# Patient Record
Sex: MALE | Race: OTHER | Employment: STUDENT | URBAN - METROPOLITAN AREA
[De-identification: names, ages, dates, MRNs, and addresses within clinical notes are randomized per-mention and may not be internally consistent; named-entity substitution may affect disease eponyms.]

---
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
## 2023-11-26 ENCOUNTER — HOSPITAL ENCOUNTER (EMERGENCY)
Facility: HOSPITAL | Age: 18
Discharge: HOME | End: 2023-11-26
Attending: EMERGENCY MEDICINE
Payer: COMMERCIAL

## 2023-11-26 VITALS
RESPIRATION RATE: 18 BRPM | OXYGEN SATURATION: 100 % | HEIGHT: 69 IN | SYSTOLIC BLOOD PRESSURE: 151 MMHG | HEART RATE: 97 BPM | DIASTOLIC BLOOD PRESSURE: 60 MMHG | BODY MASS INDEX: 25.18 KG/M2 | TEMPERATURE: 98 F | WEIGHT: 170 LBS

## 2023-11-26 DIAGNOSIS — B00.9 HSV (HERPES SIMPLEX VIRUS) INFECTION: Primary | ICD-10-CM

## 2023-11-26 PROCEDURE — 99283 EMERGENCY DEPT VISIT LOW MDM: CPT

## 2023-11-26 PROCEDURE — 99284 EMERGENCY DEPT VISIT MOD MDM: CPT

## 2023-11-26 RX ORDER — VALACYCLOVIR HYDROCHLORIDE 1 G/1
1000 TABLET, FILM COATED ORAL 3 TIMES DAILY
Qty: 21 TABLET | Refills: 0 | Status: SHIPPED | OUTPATIENT
Start: 2023-11-26 | End: 2023-12-03

## 2023-11-26 NOTE — DISCHARGE INSTRUCTIONS
Return to emergency room for any fevers of 100.4, worsening redness, fluid wave shift, any worsening symptoms. Please follow with dermatology as an outpatient. Please follow with primary care provider as an outpatient. Wound recheck in 2 days with primary care provider/urgent care. The Emergency Department is not intended to be a substitute for an effort to provide complete medical care. The imaging, if any, have often been interpreted on a preliminary basis pending final reading by the radiologist. If your blood pressure was greater than 140/90, please have this blood pressure rechecked by your primary care provider in the next several days.

## 2023-11-26 NOTE — ED INITIAL ASSESSMENT (HPI)
C/o of blister on his nose, and says he usually comes here and they prescribe him with antivirals and they improve.

## 2024-07-20 ENCOUNTER — HOSPITAL ENCOUNTER (EMERGENCY)
Facility: HOSPITAL | Age: 19
Discharge: HOME | End: 2024-07-20
Attending: EMERGENCY MEDICINE
Payer: COMMERCIAL

## 2024-07-20 ENCOUNTER — APPOINTMENT (OUTPATIENT)
Dept: GENERAL RADIOLOGY | Facility: HOSPITAL | Age: 19
End: 2024-07-20
Attending: EMERGENCY MEDICINE
Payer: COMMERCIAL

## 2024-07-20 ENCOUNTER — APPOINTMENT (OUTPATIENT)
Dept: ULTRASOUND IMAGING | Facility: HOSPITAL | Age: 19
End: 2024-07-20
Attending: EMERGENCY MEDICINE
Payer: COMMERCIAL

## 2024-07-20 ENCOUNTER — APPOINTMENT (OUTPATIENT)
Dept: CT IMAGING | Facility: HOSPITAL | Age: 19
End: 2024-07-20
Attending: EMERGENCY MEDICINE
Payer: COMMERCIAL

## 2024-07-20 ENCOUNTER — HOSPITAL ENCOUNTER (OUTPATIENT)
Age: 19
Discharge: ANOTHER HOSPITAL | End: 2024-07-20
Payer: COMMERCIAL

## 2024-07-20 VITALS
HEART RATE: 112 BPM | WEIGHT: 180 LBS | OXYGEN SATURATION: 99 % | SYSTOLIC BLOOD PRESSURE: 124 MMHG | TEMPERATURE: 99 F | BODY MASS INDEX: 27.28 KG/M2 | RESPIRATION RATE: 22 BRPM | HEIGHT: 68 IN | DIASTOLIC BLOOD PRESSURE: 74 MMHG

## 2024-07-20 VITALS
TEMPERATURE: 100 F | DIASTOLIC BLOOD PRESSURE: 71 MMHG | RESPIRATION RATE: 20 BRPM | OXYGEN SATURATION: 100 % | HEART RATE: 122 BPM | SYSTOLIC BLOOD PRESSURE: 129 MMHG

## 2024-07-20 DIAGNOSIS — R50.9 FEVER, UNSPECIFIED FEVER CAUSE: ICD-10-CM

## 2024-07-20 DIAGNOSIS — N45.1 LEFT EPIDIDYMITIS: Primary | ICD-10-CM

## 2024-07-20 DIAGNOSIS — N50.811 PAIN IN BOTH TESTICLES: Primary | ICD-10-CM

## 2024-07-20 DIAGNOSIS — N50.812 PAIN IN BOTH TESTICLES: Primary | ICD-10-CM

## 2024-07-20 DIAGNOSIS — R00.0 TACHYCARDIA: ICD-10-CM

## 2024-07-20 LAB
ANION GAP SERPL CALC-SCNC: 8 MMOL/L (ref 0–18)
BASOPHILS # BLD AUTO: 0.03 X10(3) UL (ref 0–0.2)
BASOPHILS NFR BLD AUTO: 0.2 %
BILIRUB UR QL: NEGATIVE
BUN BLD-MCNC: 14 MG/DL (ref 9–23)
BUN/CREAT SERPL: 13.3 (ref 10–20)
CALCIUM BLD-MCNC: 9.1 MG/DL (ref 8.7–10.4)
CHLORIDE SERPL-SCNC: 107 MMOL/L (ref 98–112)
CLARITY UR: CLEAR
CO2 SERPL-SCNC: 24 MMOL/L (ref 21–32)
COLOR UR: YELLOW
CREAT BLD-MCNC: 1.05 MG/DL
DEPRECATED RDW RBC AUTO: 41.7 FL (ref 35.1–46.3)
EGFRCR SERPLBLD CKD-EPI 2021: 104 ML/MIN/1.73M2 (ref 60–?)
EOSINOPHIL # BLD AUTO: 0 X10(3) UL (ref 0–0.7)
EOSINOPHIL NFR BLD AUTO: 0 %
ERYTHROCYTE [DISTWIDTH] IN BLOOD BY AUTOMATED COUNT: 12.9 % (ref 11–15)
FLUAV + FLUBV RNA SPEC NAA+PROBE: NEGATIVE
FLUAV + FLUBV RNA SPEC NAA+PROBE: NEGATIVE
GLUCOSE BLD-MCNC: 100 MG/DL (ref 70–99)
GLUCOSE UR-MCNC: NORMAL MG/DL
HCT VFR BLD AUTO: 44.4 %
HGB BLD-MCNC: 15.6 G/DL
HGB UR QL STRIP.AUTO: NEGATIVE
IMM GRANULOCYTES # BLD AUTO: 0.04 X10(3) UL (ref 0–1)
IMM GRANULOCYTES NFR BLD: 0.3 %
LEUKOCYTE ESTERASE UR QL STRIP.AUTO: NEGATIVE
LYMPHOCYTES # BLD AUTO: 0.76 X10(3) UL (ref 1–4)
LYMPHOCYTES NFR BLD AUTO: 5.3 %
MCH RBC QN AUTO: 30.9 PG (ref 26–34)
MCHC RBC AUTO-ENTMCNC: 35.1 G/DL (ref 31–37)
MCV RBC AUTO: 87.9 FL
MONOCYTES # BLD AUTO: 0.67 X10(3) UL (ref 0.1–1)
MONOCYTES NFR BLD AUTO: 4.7 %
NEUTROPHILS # BLD AUTO: 12.78 X10 (3) UL (ref 1.5–7.7)
NEUTROPHILS # BLD AUTO: 12.78 X10(3) UL (ref 1.5–7.7)
NEUTROPHILS NFR BLD AUTO: 89.5 %
NITRITE UR QL STRIP.AUTO: NEGATIVE
OSMOLALITY SERPL CALC.SUM OF ELEC: 289 MOSM/KG (ref 275–295)
PH UR: 6.5 [PH] (ref 5–8)
PLATELET # BLD AUTO: 205 10(3)UL (ref 150–450)
POTASSIUM SERPL-SCNC: 3.9 MMOL/L (ref 3.5–5.1)
PROT UR-MCNC: 50 MG/DL
RBC # BLD AUTO: 5.05 X10(6)UL
RSV RNA SPEC NAA+PROBE: NEGATIVE
SARS-COV-2 RNA RESP QL NAA+PROBE: NOT DETECTED
SARS-COV-2 RNA RESP QL NAA+PROBE: NOT DETECTED
SODIUM SERPL-SCNC: 139 MMOL/L (ref 136–145)
SP GR UR STRIP: 1.03 (ref 1–1.03)
UROBILINOGEN UR STRIP-ACNC: NORMAL
WBC # BLD AUTO: 14.3 X10(3) UL (ref 4–11)

## 2024-07-20 PROCEDURE — 93005 ELECTROCARDIOGRAM TRACING: CPT

## 2024-07-20 PROCEDURE — 76870 US EXAM SCROTUM: CPT | Performed by: EMERGENCY MEDICINE

## 2024-07-20 PROCEDURE — 93010 ELECTROCARDIOGRAM REPORT: CPT

## 2024-07-20 PROCEDURE — 0241U SARS-COV-2/FLU A AND B/RSV BY PCR (GENEXPERT): CPT | Performed by: EMERGENCY MEDICINE

## 2024-07-20 PROCEDURE — 99285 EMERGENCY DEPT VISIT HI MDM: CPT

## 2024-07-20 PROCEDURE — 87491 CHLMYD TRACH DNA AMP PROBE: CPT | Performed by: EMERGENCY MEDICINE

## 2024-07-20 PROCEDURE — 74177 CT ABD & PELVIS W/CONTRAST: CPT | Performed by: EMERGENCY MEDICINE

## 2024-07-20 PROCEDURE — 85025 COMPLETE CBC W/AUTO DIFF WBC: CPT | Performed by: EMERGENCY MEDICINE

## 2024-07-20 PROCEDURE — 99215 OFFICE O/P EST HI 40 MIN: CPT | Performed by: NURSE PRACTITIONER

## 2024-07-20 PROCEDURE — 96374 THER/PROPH/DIAG INJ IV PUSH: CPT

## 2024-07-20 PROCEDURE — 87591 N.GONORRHOEAE DNA AMP PROB: CPT | Performed by: EMERGENCY MEDICINE

## 2024-07-20 PROCEDURE — 96372 THER/PROPH/DIAG INJ SC/IM: CPT

## 2024-07-20 PROCEDURE — 81001 URINALYSIS AUTO W/SCOPE: CPT | Performed by: EMERGENCY MEDICINE

## 2024-07-20 PROCEDURE — 71046 X-RAY EXAM CHEST 2 VIEWS: CPT | Performed by: EMERGENCY MEDICINE

## 2024-07-20 PROCEDURE — 96361 HYDRATE IV INFUSION ADD-ON: CPT

## 2024-07-20 PROCEDURE — 80048 BASIC METABOLIC PNL TOTAL CA: CPT | Performed by: EMERGENCY MEDICINE

## 2024-07-20 PROCEDURE — 93975 VASCULAR STUDY: CPT | Performed by: EMERGENCY MEDICINE

## 2024-07-20 PROCEDURE — U0002 COVID-19 LAB TEST NON-CDC: HCPCS | Performed by: NURSE PRACTITIONER

## 2024-07-20 RX ORDER — KETOROLAC TROMETHAMINE 15 MG/ML
15 INJECTION, SOLUTION INTRAMUSCULAR; INTRAVENOUS ONCE
Status: COMPLETED | OUTPATIENT
Start: 2024-07-20 | End: 2024-07-20

## 2024-07-20 RX ORDER — ACETAMINOPHEN 500 MG
1000 TABLET ORAL ONCE
Status: COMPLETED | OUTPATIENT
Start: 2024-07-20 | End: 2024-07-20

## 2024-07-20 RX ORDER — IBUPROFEN 600 MG/1
600 TABLET ORAL ONCE
Status: COMPLETED | OUTPATIENT
Start: 2024-07-20 | End: 2024-07-20

## 2024-07-20 RX ORDER — LEVOFLOXACIN 500 MG/1
500 TABLET, FILM COATED ORAL DAILY
Qty: 9 TABLET | Refills: 0 | Status: SHIPPED | OUTPATIENT
Start: 2024-07-20 | End: 2024-07-29

## 2024-07-20 RX ORDER — LEVOFLOXACIN 500 MG/1
500 TABLET, FILM COATED ORAL ONCE
Status: COMPLETED | OUTPATIENT
Start: 2024-07-20 | End: 2024-07-20

## 2024-07-20 NOTE — ED INITIAL ASSESSMENT (HPI)
Pt here from home for fever last night and groin pain yesterday evening. Took tylenol for fever. C/o of chills. Pt states he has discomfort in his testicles. Denies trauma. No numbness to scrotum.

## 2024-07-20 NOTE — ED PROVIDER NOTES
Patient Seen in: [facility]      History     Chief Complaint   Patient presents with    Headache     Stated Complaint: Chills, Headache    Subjective:   HPI  Patient is a 19-year-old male who presents to the immediate care center with concern for fever, chills, headache, general malaise and myalgia that started yesterday.  Patient is also concerned because he has had bilateral testicular pain that is been intermittent but persistent during the same time.  He denies known illness exposure; denies all recent injury.  He does acknowledge that he has had a sexual encounter last week with a new partner, and while he does not have high suspicion for sexually transmitted infection, acknowledges it is possible.          Objective:   History reviewed. No pertinent past medical history.           History reviewed. No pertinent surgical history.             No pertinent social history.            Review of Systems   Constitutional:  Positive for chills and fever. Negative for appetite change.   HENT:  Negative for congestion and sore throat.    Respiratory:  Negative for cough.    Cardiovascular:  Negative for chest pain.   Gastrointestinal:  Positive for diarrhea (He did have multiple episodes of diarrhea yesterday, none today.). Negative for abdominal pain, nausea and vomiting.   Genitourinary:  Positive for testicular pain. Negative for dysuria, flank pain, frequency, penile pain and scrotal swelling.   Musculoskeletal:  Negative for back pain.   Skin:  Negative for rash.   Neurological:  Positive for dizziness (Today) and headaches.       Positive for stated Chief Complaint: Headache    Other systems are as noted in HPI.  Constitutional and vital signs reviewed.      All other systems reviewed and negative except as noted above.    Physical Exam     ED Triage Vitals [07/20/24 1031]   /71   Pulse (!) 122   Resp 20   Temp 99.6 °F (37.6 °C)   Temp src Oral   SpO2 100 %   O2 Device None (Room air)        Current Vitals:   Vital Signs  BP: 129/71  Pulse: (!) 122  Resp: 20  Temp: 99.6 °F (37.6 °C)  Temp src: Oral    Oxygen Therapy  SpO2: 100 %  O2 Device: None (Room air)            Physical Exam  Vitals and nursing note reviewed. Chaperone present: Chaperone was offered for testicular exam; he declined..   Constitutional:       General: He is not in acute distress.     Appearance: He is ill-appearing. He is not toxic-appearing.   HENT:      Head: Normocephalic and atraumatic.      Nose: Nose normal.   Eyes:      Extraocular Movements: Extraocular movements intact.      Conjunctiva/sclera: Conjunctivae normal.      Pupils: Pupils are equal, round, and reactive to light.   Cardiovascular:      Rate and Rhythm: Regular rhythm.   Pulmonary:      Effort: Pulmonary effort is normal. No respiratory distress.   Abdominal:      Hernia: There is no hernia in the left inguinal area or right inguinal area.   Genitourinary:     Penis: Normal.       Testes:         Right: Tenderness or testicular hydrocele not present.         Left: Tenderness or testicular hydrocele not present.      Epididymis:      Right: Not inflamed or enlarged. No tenderness.      Left: Not inflamed or enlarged. No tenderness.   Musculoskeletal:      Cervical back: Normal range of motion and neck supple. No rigidity.   Skin:     General: Skin is warm and dry.   Neurological:      Mental Status: He is alert and oriented to person, place, and time.      GCS: GCS eye subscore is 4. GCS verbal subscore is 5. GCS motor subscore is 6.      Cranial Nerves: No cranial nerve deficit, dysarthria or facial asymmetry.      Sensory: Sensation is intact.      Motor: No weakness.      Coordination: Romberg sign negative. Coordination normal. Finger-Nose-Finger Test and Heel to Shin Test normal.      Gait: Gait normal.   Psychiatric:         Behavior: Behavior normal.               ED Course     Labs Reviewed   RAPID SARS-COV-2 BY PCR - Normal                      MDM       Differential diagnoses (listed below) were reviewed with patient at bedside prior to disposition.  He was informed of limited resources in the immediate care center to consider many of these significant differentials.  Negative sequelae were discussed.  We also discussed that he has significant tachycardia that could be a sign of significant pathology.  For these reasons, he was informed that is important for him to go directly from here to the emergency department for further evaluation and treatment as may be indicated.  He states understanding agrees with plan.                                 Medical Decision Making  Differential diagnoses considered today include, but are not exclusive of: Acute encephalitis, meningitis; viral syndrome; dehydration, hypokalemia, acute kidney injury; sexually transmitted infection: Gonorrhea, chlamydia, trichomoniasis, syphilis; epididymitis, orchitis, testicular torsion, testicular cancer.     Problems Addressed:  Fever, unspecified fever cause: undiagnosed new problem with uncertain prognosis  Pain in both testicles: undiagnosed new problem with uncertain prognosis  Tachycardia: undiagnosed new problem with uncertain prognosis        Disposition and Plan     Clinical Impression:  1. Pain in both testicles    2. Fever, unspecified fever cause    3. Tachycardia         Disposition:  Ic to ed  7/20/2024 11:21 am    Follow-up:  No follow-up provider specified.        Medications Prescribed:  There are no discharge medications for this patient.

## 2024-07-20 NOTE — ED PROVIDER NOTES
Patient Seen in: [facility] Emergency Department    History   No chief complaint on file.      HPI    19-year-old male here with fevers, mild palpitations, scrotal pain in addition to generalized abdominal pain mostly in the lower quadrants though reports that the abdominal pain seems to have resolved in addition to the scrotal pain resolving.  He does report unprotected sexual partners.  No chest pain or dyspnea    History reviewed. History reviewed. No pertinent past medical history.    History reviewed. History reviewed. No pertinent surgical history.      Medications :  (Not in a hospital admission)       No family history on file.    Smoking Status:   Social History     Socioeconomic History    Marital status: Unknown   Tobacco Use    Smoking status: Never    Smokeless tobacco: Never   Vaping Use    Vaping status: Never Used   Substance and Sexual Activity    Alcohol use: Yes     Comment: occasional weekend    Drug use: Not Currently       Constitutional and vital signs reviewed.      Social History and Family History elements reviewed from today, pertinent positives to the presenting problem noted.    Physical Exam     ED Triage Vitals [07/20/24 1229]   /86   Pulse (!) 130   Resp 24   Temp 98.6 °F (37 °C)   Temp src Oral   SpO2 99 %   O2 Device None (Room air)       All measures to prevent infection transmission during my interaction with the patient were taken. The patient was already wearing a droplet mask on my arrival to the room. Personal protective equipment was worn throughout the duration of the exam.  Handwashing was performed prior to and after the exam.  Stethoscope and any equipment used during my examination was cleaned with super sani-cloth germicidal wipes following the exam.     Physical Exam    General: NAD  Head: Normocephalic and atraumatic.  Mouth/Throat/Ears/Nose: Oropharynx is clear and moist.   Eyes: Conjunctivae and EOM are normal.   Neck: Normal range of motion. Supple.    Cardiovascular: tachycardic rate, regular rhythm, normal heart sounds.  Respiratory/Chest: Clear and equal bilaterally. Exhibits no tenderness.  Gastrointestinal: Soft, non-tender, non-distended. Bowel sounds are normal.   : Cremasteric reflex bilaterally, no lesions or any tenderness around the scrotum or penis. circumcised penis. No obvious hernia palpated. No bullae or crepitus or erythema.  Musculoskeletal:No swelling or deformity.   Neurological: Alert and appropriate. No focal deficits.   Skin: Skin is warm and dry. No pallor.  Psychiatric: Has a normal mood and affect.      ED Course        Labs Reviewed   URINALYSIS WITH CULTURE REFLEX - Abnormal; Notable for the following components:       Result Value    Ketones Urine Trace (*)     Protein Urine 50 (*)     All other components within normal limits   BASIC METABOLIC PANEL (8) - Abnormal; Notable for the following components:    Glucose 100 (*)     All other components within normal limits   CBC W/ DIFFERENTIAL - Abnormal; Notable for the following components:    WBC 14.3 (*)     Neutrophil Absolute Prelim 12.78 (*)     Neutrophil Absolute 12.78 (*)     Lymphocyte Absolute 0.76 (*)     All other components within normal limits   SARS-COV-2/FLU A AND B/RSV BY PCR ([employer]) - Normal    Narrative:     This test is intended for the qualitative detection and differentiation of SARS-CoV-2, influenza A, influenza B, and respiratory syncytial virus (RSV) viral RNA in nasopharyngeal or nares swabs from individuals suspected of respiratory viral infection consistent with COVID-19 by their healthcare provider. Signs and symptoms of respiratory viral infection due to SARS-CoV-2, influenza, and RSV can be similar.                                    Test performed using the Xpert Xpress SARS-CoV-2/FLU/RSV (real time RT-PCR)  assay on the [employer] instrument, [employer], [place] [address].                   This test is being used under the Food and Drug  Administration's Emergency Use Authorization.                                    The authorized Fact Sheet for Healthcare Providers for this assay is available upon request from the laboratory.   CBC WITH DIFFERENTIAL WITH PLATELET    Narrative:     The following orders were created for panel order CBC With Differential With Platelet.                  Procedure                               Abnormality         Status                                     ---------                               -----------         ------                                     CBC W/ DIFFERENTIAL[[id number]]          Abnormal            Final result                                                 Please view results for these tests on the individual orders.   CHLAMYDIA/GONOCOCCUS, KAMALA     EKG    Rate, intervals and axes as noted on EKG Report.  Rate: 124  Rhythm: Sinus Rhythm  Reading: No STEMI.  This is my interpretation.           As Interpreted by me    Imaging Results Available and Reviewed while in ED: CT ABDOMEN+PELVIS(CONTRAST ONLY)(CPT=74177)    Result Date: 7/20/2024  CONCLUSION:  No acute intra-abdominal process.    Dictated by (CST): Evens Mendoza MD on 7/20/2024 at 4:10 PM     Finalized by (CST): Evens Mendoza MD on 7/20/2024 at 4:18 PM          US SCROTUM W/ DOPPLER (CPT=93975/89136)    Result Date: 7/20/2024  CONCLUSION:  Mild left-sided epididymitis.  No associated orchitis.    Dictated by (CST): Evens Mendoza MD on 7/20/2024 at 3:06 PM     Finalized by (CST): Evens Mendoza MD on 7/20/2024 at 3:08 PM          XR CHEST PA + LAT CHEST (CPT=71046)    Result Date: 7/20/2024  CONCLUSION:  No acute cardiopulmonary process.    Dictated by (CST): Evens Mendoza MD on 7/20/2024 at 1:59 PM     Finalized by (CST): Evens Mendoza MD on 7/20/2024 at 2:00 PM         ED Medications Administered:   Medications   sodium chloride 0.9 % IV bolus 1,000 mL (0 mL Intravenous Stopped 7/20/24 1511)   ketorolac (Toradol)  15 MG/ML injection 15 mg (15 mg Intravenous Given 7/20/24 1326)   acetaminophen (Tylenol Extra Strength) tab 1,000 mg (1,000 mg Oral Given 7/20/24 1326)   sodium chloride 0.9 % IV bolus 1,000 mL (0 mL Intravenous Stopped 7/20/24 1612)   ibuprofen (Motrin) tab 600 mg (600 mg Oral Given 7/20/24 1511)   iopamidol 76% (ISOVUE-370) injection for power injector (75 mL Intravenous Given 7/20/24 1601)   cefTRIAXone (Rocephin) 500 mg in lidocaine PF (Xylocaine-MPF) 1 % IM syringe for [facility] ED (500 mg Intramuscular Given 7/20/24 1650)   levoFLOXacin (Levaquin) tab 500 mg (500 mg Oral Given 7/20/24 1650)         MDM     Vitals:    07/20/24 1500 07/20/24 1530 07/20/24 1615 07/20/24 1630   BP: 121/62 118/68 132/72 124/74   Pulse: 96 115 108 112   Resp: 21 20 19 22   Temp:       TempSrc:       SpO2: 98% 99% 98% 99%   Weight:       Height:         *I personally reviewed and interpreted all ED vitals.    Pulse Ox: 99%, Room air, Normal     Monitor Interpretation:   sinus tachycardia as interpreted by me.  The cardiac monitor was ordered given tachycardic vitals.      Medical Decision Making      Differential Diagnosis/ Diagnostic Considerations: Appendicitis, epididymitis, COVID-19    Complicating Factors: The patient already has does not have a problem list on file. to contribute to the complexity of this ED evaluation.    I reviewed prior chart records including urgent care visit note prompting referral to the emergency department.  Patient here without any evidence of appendicitis on the CT scan on my interpretation.  Scrotal ultrasound did demonstrate left sided epididymitis.  No evidence of orchitis or abscess.  His heart rate did improved with fluids and antipyretics.  Reviewed and notable on my interpretation for nonspecific leukocytosis.  Gonorrhea chlamydia labs sent off and patient understands that these will be in process and he will be called by ED pharmacist should this be positive however he is already on the  appropriate treatment given that he was provided with a dose of ceftriaxone in the emergency department and prescribed Levaquin.  He was also advised to have his sexual partners tested and potentially treated.  Discharged home in stable condition with the parents were comfortable with discharge plan.    Prescriptions: As above      Disposition and Plan     Clinical Impression:  1. Left epididymitis        Disposition:  Discharge    Follow-up:  Adeline Schreiber MD  [address] [address]  [phone]    Schedule an appointment as soon as possible for a visit in 1 day(s)        Medications Prescribed:  Discharge Medication List as of 7/20/2024  4:55 PM        START taking these medications    Details   levoFLOXacin 500 MG Oral Tab Take 1 tablet (500 mg total) by mouth daily for 9 days., Normal, Disp-9 tablet, R-0

## 2024-07-21 LAB
ATRIAL RATE: 124 BPM
P AXIS: 44 DEGREES
P-R INTERVAL: 144 MS
Q-T INTERVAL: 300 MS
QRS DURATION: 90 MS
QTC CALCULATION (BEZET): 431 MS
R AXIS: 93 DEGREES
T AXIS: 30 DEGREES
VENTRICULAR RATE: 124 BPM

## 2024-07-22 ENCOUNTER — HOSPITAL ENCOUNTER (OUTPATIENT)
Age: 19
Discharge: HOME | End: 2024-07-22
Payer: COMMERCIAL

## 2024-07-22 VITALS
HEART RATE: 84 BPM | RESPIRATION RATE: 20 BRPM | DIASTOLIC BLOOD PRESSURE: 67 MMHG | TEMPERATURE: 98 F | OXYGEN SATURATION: 100 % | SYSTOLIC BLOOD PRESSURE: 136 MMHG

## 2024-07-22 DIAGNOSIS — B00.89 HERPES GLADIATORUM: Primary | ICD-10-CM

## 2024-07-22 LAB
C TRACH DNA SPEC QL NAA+PROBE: NEGATIVE
N GONORRHOEA DNA SPEC QL NAA+PROBE: NEGATIVE

## 2024-07-22 PROCEDURE — 99213 OFFICE O/P EST LOW 20 MIN: CPT | Performed by: NURSE PRACTITIONER

## 2024-07-22 RX ORDER — VALACYCLOVIR HYDROCHLORIDE 1 G/1
1000 TABLET, FILM COATED ORAL 3 TIMES DAILY
Qty: 21 TABLET | Refills: 0 | Status: SHIPPED | OUTPATIENT
Start: 2024-07-22 | End: 2024-07-29

## 2024-07-22 NOTE — ED PROVIDER NOTES
Patient Seen in: [facility]      History     Chief Complaint   Patient presents with    Mouth Cold Sores     Stated Complaint: ubaldo ALVARENGA    Subjective:   HPI  Patient is a 19-year-old male that presents to the immediate care center with concern for lesions to the right side of his nose.  These have been persistent for a couple of days.  He is very familiar with these stating he has had 2 or 3 outbreaks annually for greater than 5 years.  He reports they are typically treated with antiviral medication.  His current lesions are isolated to the nose.  He denies eye symptoms; has had no sores in his mouth; no pain or swelling of the throat.    Of note, patient was evaluated and treated in the emergency department last week for epididymitis.  He is currently taking levofloxacin as prescribed and states testicular symptoms are improved.          Objective:   No pertinent past medical history.            No pertinent past surgical history.              No pertinent social history.            Review of Systems   Constitutional:  Negative for appetite change, chills and fever.   HENT:  Negative for congestion and sore throat.    Eyes:  Negative for photophobia, pain, redness, itching and visual disturbance.   Skin:  Positive for rash.   Neurological:  Negative for dizziness, weakness and headaches.       Positive for stated Chief Complaint: Mouth Cold Sores    Other systems are as noted in HPI.  Constitutional and vital signs reviewed.      All other systems reviewed and negative except as noted above.    Physical Exam     ED Triage Vitals [07/22/24 0945]   /67   Pulse 84   Resp 20   Temp 98.2 °F (36.8 °C)   Temp src Temporal   SpO2 100 %   O2 Device None (Room air)       Current Vitals:   Vital Signs  BP: 136/67  Pulse: 84  Resp: 20  Temp: 98.2 °F (36.8 °C)  Temp src: Temporal    Oxygen Therapy  SpO2: 100 %  O2 Device: None (Room air)            Physical Exam  Vitals and nursing note reviewed.    Constitutional:       General: He is not in acute distress.     Appearance: He is not ill-appearing.   HENT:      Head: Normocephalic and atraumatic.     Pulmonary:      Effort: Pulmonary effort is normal. No respiratory distress.   Musculoskeletal:      Cervical back: Normal range of motion and neck supple.   Skin:     General: Skin is warm and dry.   Neurological:      Mental Status: He is alert and oriented to person, place, and time.   Psychiatric:         Behavior: Behavior normal.               ED Course   Labs Reviewed - No data to display                   MDM      Patient was given prescription for valacyclovir today to complete as directed.  He was encouraged to complete his levofloxacin as previously prescribed.  He was encouraged to follow with primary care provider next week if symptoms continue.                                   Medical Decision Making  Differential diagnoses considered today include, but are not exclusive of: Herpetic lesions; impetigo; cellulitis; herpes zoster ophthalmicus (patient has no ocular symptoms)    Problems Addressed:  Herpes gladiatorum: self-limited or minor problem    Risk  OTC drugs.  Prescription drug management.        Disposition and Plan     Clinical Impression:  1. Herpes gladiatorum         Disposition:  Discharge  7/22/2024 10:08 am    Follow-up:  Your primary care provider  Call to schedule appointment to be seen in 5-7 days.    As needed    Anaid Osman MD  [address] [address] [phone]      As needed          Medications Prescribed:  Current Discharge Medication List        START taking these medications    Details   valACYclovir 1 G Oral Tab Take 1 tablet (1,000 mg total) by mouth 3 (three) times daily for 7 days.  Qty: 21 tablet, Refills: 0

## 2024-07-29 ENCOUNTER — HOSPITAL ENCOUNTER (OUTPATIENT)
Age: 19
Discharge: HOME | End: 2024-07-29
Payer: COMMERCIAL

## 2024-07-29 VITALS
OXYGEN SATURATION: 100 % | SYSTOLIC BLOOD PRESSURE: 152 MMHG | RESPIRATION RATE: 18 BRPM | TEMPERATURE: 99 F | HEART RATE: 73 BPM | DIASTOLIC BLOOD PRESSURE: 85 MMHG

## 2024-07-29 DIAGNOSIS — R03.0 ELEVATED BLOOD PRESSURE READING: ICD-10-CM

## 2024-07-29 DIAGNOSIS — Z87.438 HISTORY OF EPIDIDYMITIS: ICD-10-CM

## 2024-07-29 DIAGNOSIS — Z09 FOLLOW-UP EXAM AFTER TREATMENT: Primary | ICD-10-CM

## 2024-07-29 PROCEDURE — 99202 OFFICE O/P NEW SF 15 MIN: CPT | Performed by: PHYSICIAN ASSISTANT

## 2024-07-29 NOTE — ED PROVIDER NOTES
Patient Seen in: [facility]      History     Chief Complaint   Patient presents with    Follow - Up     Stated Complaint: FOLLOW UP    Subjective:   HPI    Patient is a 19-year-old male, presenting to immediate care for follow-up evaluation after being treated for left epidermidis with oral antibiotics (levofloxacin).  Patient was initially seen in our clinic on 7/20/2024 for fever and left scrotal pain with abdominal pain and sent to ER for evaluation.  Patient had CT scan of abdomen pelvis and ultrasound notable for left epidermidis.  STD screening from ER encounter were negative per review of chart.  In addition urine testing was negative.  Patient completed last dose of oral antibiotics today.  States he is overall feels well.  Has no current symptoms.  Has no fevers, abdominal pain, or scrotal pain/swelling.  No urinary symptoms.  Here for follow-up.  Does not have current primary doctor.  He has initial follow-up with primary doctor Dr. Olea in August 2024.  Like to follow-up.  Unsure if he can resume regular exercise, work activity, and resume sex practices    Objective:   History reviewed. No pertinent past medical history.           History reviewed. No pertinent surgical history.             Social History     Socioeconomic History    Marital status: Unknown   Tobacco Use    Smoking status: Never    Smokeless tobacco: Never   Vaping Use    Vaping status: Never Used   Substance and Sexual Activity    Alcohol use: Yes     Comment: occasional weekend    Drug use: Not Currently   Social History Narrative    ** Merged History Encounter **                   Review of Systems   Constitutional:  Negative for chills and fever.   Gastrointestinal:  Negative for abdominal pain, nausea and vomiting.   Genitourinary:  Negative for decreased urine volume, difficulty urinating, dysuria, flank pain, frequency, genital sores, hematuria, penile discharge, penile pain, penile swelling, scrotal swelling and  testicular pain.   Musculoskeletal:  Negative for back pain.   Allergic/Immunologic: Negative for immunocompromised state.   Psychiatric/Behavioral:  Negative for confusion.        Positive for stated Chief Complaint: Follow - Up    Other systems are as noted in HPI.  Constitutional and vital signs reviewed.      All other systems reviewed and negative except as noted above.    Physical Exam     ED Triage Vitals [07/29/24 1124]   /85   Pulse 73   Resp 18   Temp 98.8 °F (37.1 °C)   Temp src Temporal   SpO2 100 %   O2 Device None (Room air)       Current Vitals:   Vital Signs  BP: 152/85  Pulse: 73  Resp: 18  Temp: 98.8 °F (37.1 °C)  Temp src: Temporal    Oxygen Therapy  SpO2: 100 %  O2 Device: None (Room air)            Physical Exam  Vitals and nursing note reviewed.   Constitutional:       General: He is not in acute distress.     Appearance: Normal appearance. He is not ill-appearing, toxic-appearing or diaphoretic.   HENT:      Head: Normocephalic and atraumatic.      Mouth/Throat:      Mouth: Mucous membranes are moist.   Eyes:      Conjunctiva/sclera: Conjunctivae normal.   Cardiovascular:      Rate and Rhythm: Normal rate.      Pulses: Normal pulses.   Pulmonary:      Effort: No respiratory distress.   Musculoskeletal:         General: Normal range of motion.      Cervical back: Normal range of motion and neck supple. No rigidity.   Neurological:      General: No focal deficit present.      Mental Status: He is alert and oriented to person, place, and time.      Motor: No weakness.      Coordination: Coordination normal.      Gait: Gait normal.   Psychiatric:         Mood and Affect: Mood normal.         Behavior: Behavior normal.             ED Course   Labs Reviewed - No data to display            MDM       Patient is a 19-year-old male, presenting to immediate care for evaluation of follow-up after being treated for left epidermidis with oral antibiotics levofloxacin.  Patient is currently  asymptomatic.  Patient without current primary doctor.  Will be establishing primary care in August 2024.  Here for follow-up evaluation and for recommendations of resuming regular activities.  Discussed with patient and cause/treatment.  In addition patient currently asymptomatic and appropriately treated.  May resume activity without restrictions.  Discharge instructions on the [employer] is provided.  Further PCP follow-up including evaluation of elevated blood pressure reading in clinic.  Return precautions                               Medical Decision Making      Disposition and Plan     Clinical Impression:  1. Follow-up exam after treatment    2. History of epididymitis    3. Elevated blood pressure reading         Disposition:  Discharge  7/29/2024 11:50 am    Follow-up:  Anaid Osman MD  [address] [address]  [phone]                Medications Prescribed:  Current Discharge Medication List

## 2024-07-29 NOTE — ED INITIAL ASSESSMENT (HPI)
Pt here to follow up s/p epididymitis diagnosis and treatment; pt report he is currently asymptomatic; want to speak with a provider

## (undated) NOTE — LETTER
Date & Time: 2/21/2021, 12:53 PM  Patient: Brian Soto  Encounter Provider(s):    Juan Carlos Payton MD       To Whom It May Concern:    Brian Soto was seen and treated in our department on 2/21/2021.  He should not return to school until 2/2

## (undated) NOTE — ED AVS SNAPSHOT
Parent/Legal Guardian Access to the Online [employer] Record of a Patient 15to 16Years Old  Return completed form by Secure email to [facility] Department: [email or web address].     Requirements and Procedures   Under [facility] ·  I understand that [employer] is intended as a secure online source of confidential medical information.  If I share my [employer] ID and password with another person, that person may be able to view my or my child’s health information, and health information a · This form does not substitute as an Authorization to Release health information to a designated proxy by any other method. The purpose of this Minor Proxy form is for access to the [facility] portal information.     By signing below, I acknowledge that I ha I have read and understand the requirements and procedures for accessing my child’s medical record information online as provided  on page one of this document titled, Parent/Legal Guardian Access to the Online MyChart Record of a Patient [address] to [address]